# Patient Record
Sex: FEMALE | Race: WHITE | URBAN - METROPOLITAN AREA
[De-identification: names, ages, dates, MRNs, and addresses within clinical notes are randomized per-mention and may not be internally consistent; named-entity substitution may affect disease eponyms.]

---

## 2021-09-09 ENCOUNTER — OFFICE VISIT (OUTPATIENT)
Dept: URBAN - METROPOLITAN AREA CLINIC 40 | Facility: CLINIC | Age: 51
End: 2021-09-09
Payer: OTHER GOVERNMENT

## 2021-09-09 DIAGNOSIS — H18.20 CORNEAL EDEMA: Primary | ICD-10-CM

## 2021-09-09 PROCEDURE — 99203 OFFICE O/P NEW LOW 30 MIN: CPT | Performed by: OPTOMETRIST

## 2021-09-09 RX ORDER — PREDNISOLONE ACETATE 10 MG/ML
1 % SUSPENSION/ DROPS OPHTHALMIC
Qty: 5 | Refills: 0 | Status: INACTIVE
Start: 2021-09-09 | End: 2021-10-06

## 2021-09-09 NOTE — IMPRESSION/PLAN
Impression: Corneal edema: H18.20. Plan: Discussed diagnosis in detail with patient. Will continue to observe condition and or symptoms. New medication(s) Rx given today.

## 2021-09-10 ENCOUNTER — OFFICE VISIT (OUTPATIENT)
Dept: URBAN - METROPOLITAN AREA CLINIC 40 | Facility: CLINIC | Age: 51
End: 2021-09-10
Payer: OTHER GOVERNMENT

## 2021-09-10 PROCEDURE — 99213 OFFICE O/P EST LOW 20 MIN: CPT | Performed by: OPTOMETRIST

## 2021-09-10 NOTE — IMPRESSION/PLAN
Impression: Corneal edema: H18.20. Plan: Discussed diagnosis in detail with patient. Will continue to observe condition and or symptoms. Taper medication(s) as instructed.

## 2022-02-08 ENCOUNTER — OFFICE VISIT (OUTPATIENT)
Dept: URBAN - METROPOLITAN AREA CLINIC 40 | Facility: CLINIC | Age: 52
End: 2022-02-08
Payer: OTHER GOVERNMENT

## 2022-02-08 DIAGNOSIS — H52.4 PRESBYOPIA: Primary | ICD-10-CM

## 2022-02-08 PROCEDURE — 92014 COMPRE OPH EXAM EST PT 1/>: CPT | Performed by: OPTOMETRIST

## 2022-02-08 ASSESSMENT — INTRAOCULAR PRESSURE
OS: 16
OD: 16

## 2022-02-08 ASSESSMENT — KERATOMETRY
OD: 46.88
OS: 47.25

## 2022-02-08 ASSESSMENT — VISUAL ACUITY
OS: 20/20
OD: 20/20

## 2025-03-17 ENCOUNTER — OFFICE VISIT (OUTPATIENT)
Dept: URBAN - METROPOLITAN AREA CLINIC 30 | Facility: CLINIC | Age: 55
End: 2025-03-17
Payer: OTHER GOVERNMENT

## 2025-03-17 DIAGNOSIS — H43.813 VITREOUS DEGENERATION, BILATERAL: ICD-10-CM

## 2025-03-17 DIAGNOSIS — H20.021 RECURRENT ACUTE IRIDOCYCLITIS, RIGHT EYE: Primary | ICD-10-CM

## 2025-03-17 PROCEDURE — 92134 CPTRZ OPH DX IMG PST SGM RTA: CPT | Performed by: OPTOMETRIST

## 2025-03-17 PROCEDURE — 99204 OFFICE O/P NEW MOD 45 MIN: CPT | Performed by: OPTOMETRIST

## 2025-03-17 RX ORDER — PREDNISOLONE ACETATE 10 MG/ML
1 % SUSPENSION/ DROPS OPHTHALMIC
Qty: 5 | Refills: 0 | Status: INACTIVE
Start: 2025-03-17 | End: 2025-03-21

## 2025-03-17 ASSESSMENT — VISUAL ACUITY
OS: 20/25
OD: 20/25

## 2025-03-17 ASSESSMENT — INTRAOCULAR PRESSURE
OS: 13
OD: 13

## 2025-03-17 ASSESSMENT — KERATOMETRY
OS: 46.75
OD: 46.88

## 2025-03-21 ENCOUNTER — OFFICE VISIT (OUTPATIENT)
Dept: URBAN - METROPOLITAN AREA CLINIC 30 | Facility: CLINIC | Age: 55
End: 2025-03-21
Payer: OTHER GOVERNMENT

## 2025-03-21 PROCEDURE — 99213 OFFICE O/P EST LOW 20 MIN: CPT | Performed by: OPTOMETRIST

## 2025-03-21 RX ORDER — PREDNISOLONE ACETATE 10 MG/ML
1 % SUSPENSION/ DROPS OPHTHALMIC
Qty: 5 | Refills: 1 | Status: ACTIVE
Start: 2025-03-21

## 2025-03-21 ASSESSMENT — INTRAOCULAR PRESSURE
OS: 14
OD: 14